# Patient Record
Sex: FEMALE | URBAN - METROPOLITAN AREA
[De-identification: names, ages, dates, MRNs, and addresses within clinical notes are randomized per-mention and may not be internally consistent; named-entity substitution may affect disease eponyms.]

---

## 2024-02-28 ENCOUNTER — CLINICAL DOCUMENTATION (OUTPATIENT)
Age: 39
End: 2024-02-28

## 2024-02-28 ENCOUNTER — OFFICE VISIT (OUTPATIENT)
Age: 39
End: 2024-02-28

## 2024-02-28 ENCOUNTER — HOSPITAL ENCOUNTER (OUTPATIENT)
Facility: HOSPITAL | Age: 39
Setting detail: SPECIMEN
Discharge: HOME | End: 2024-03-02

## 2024-02-28 VITALS — WEIGHT: 211 LBS | HEIGHT: 55 IN | BODY MASS INDEX: 48.83 KG/M2

## 2024-02-28 DIAGNOSIS — N64.4 BREAST PAIN: Primary | ICD-10-CM

## 2024-02-28 DIAGNOSIS — R92.8 ABNORMAL ULTRASOUND OF BREAST: ICD-10-CM

## 2024-02-28 NOTE — PROGRESS NOTES
HISTORY OF PRESENT ILLNESS  Suzanne Ravi is a 39 y.o. female.    HPI  NEW patient consult referred for RIGHT axilla swelling.  Patient states she first knot October 2023. Pressure brings pain.     Patient states 3 weeks after Carpel tunnel surgery right wrist notice lump. Never went away.       With movement swelling is noticeable        Family History: none        US-10/2023 & 11/14/23 BI-RADS         On 1/2/24  US done,  results for RIGHT breast axilla same but showed abnormality in LEFT axilla-lymph node area    No past medical history on file.    No past surgical history on file.    Social History     Socioeconomic History    Marital status: Unknown     Spouse name: Not on file    Number of children: Not on file    Years of education: Not on file    Highest education level: Not on file   Occupational History    Not on file   Tobacco Use    Smoking status: Never    Smokeless tobacco: Never   Substance and Sexual Activity    Alcohol use: Not on file    Drug use: Not on file    Sexual activity: Not on file   Other Topics Concern    Not on file   Social History Narrative    Not on file     Social Determinants of Health     Financial Resource Strain: Not on file   Food Insecurity: Not on file   Transportation Needs: Not on file   Physical Activity: Not on file   Stress: Not on file   Social Connections: Not on file   Intimate Partner Violence: Not on file   Housing Stability: Not on file       No current outpatient medications on file prior to visit.     No current facility-administered medications on file prior to visit.       Not on File    OB History    No obstetric history on file.      Obstetric Comments   Menarche 11, LMP 02/02/24, # of children 0, age of 1st delivery -, Hysterectomy/oophorectomy no/no, Breast bx no, history of breast feeding no, BCP no, Hormone therapy no               Review of Systems  All other systems reviewed and are negative.       Physical Exam  Exam conducted with a chaperone

## 2024-02-28 NOTE — PROGRESS NOTES
[facility]   OFFICE PROCEDURE PROGRESS NOTE        Chart reviewed for the following:   Naseem GARCÍA MD, have reviewed the History, Physical and updated the Allergic reactions for Suzanne Ravi     TIME OUT performed immediately prior to start of procedure:   Naseem GARCÍA MD, have performed the following reviews on Suzanne Ravi prior to the start of the procedure:            * Patient was identified by name and date of birth   * Agreement on procedure being performed was verified  * Risks and Benefits explained to the patient  * Procedure site verified and marked as necessary  * Patient was positioned for comfort  * Consent was signed and verified     Time: 10:19am      Date of procedure: 2/28/2024    Procedure performed by:  Naseem Thakkar MD    Provider assisted by: Gita Zacarias RN    Patient assisted by: self    How tolerated by patient: tolerated the procedure well with no complications    Post Procedural Pain Scale: 0    Comments: I have reviewed the provider's instructions with the patient, answering all questions to her satisfaction.

## 2024-02-28 NOTE — PROGRESS NOTES
HISTORY OF PRESENT ILLNESS  Suzanne Ravi is a 39 y.o. female     HPI NEW patient consult referred for ***.RIGHT axilla swelling.  Patient states she first knot October 2023. Pressure brings pain.    Patient states 3 weeks after Carpel tunnel surgery right wrist notice lump. Never went away.      With movement swelling is noticeable      Family History: none      US-10/2023 & 11/14/23 BI-RADS       On 1/2/24  US done,  results for RIGHT breast axilla same but showed abnormality in LEFT axilla-lymph node area        Review of Systems      Physical Exam       ASSESSMENT and PLAN  {Assessment and Plan Chronic Disease:[id number]}

## 2024-03-04 ENCOUNTER — TELEPHONE (OUTPATIENT)
Age: 39
End: 2024-03-04

## 2024-08-20 ENCOUNTER — TELEPHONE (OUTPATIENT)
Age: 39
End: 2024-08-20

## 2024-08-20 NOTE — TELEPHONE ENCOUNTER
Patient called and ask that we send her records to [id number] for insurance purpose. Faxed ov note, pathology results to [phone].